# Patient Record
Sex: FEMALE | Race: BLACK OR AFRICAN AMERICAN | Employment: FULL TIME | URBAN - METROPOLITAN AREA
[De-identification: names, ages, dates, MRNs, and addresses within clinical notes are randomized per-mention and may not be internally consistent; named-entity substitution may affect disease eponyms.]

---

## 2022-01-03 ENCOUNTER — HOSPITAL ENCOUNTER (OUTPATIENT)
Dept: PHYSICAL THERAPY | Age: 47
Discharge: HOME | End: 2022-01-03
Payer: COMMERCIAL

## 2022-01-03 PROCEDURE — 97162 PT EVAL MOD COMPLEX 30 MIN: CPT

## 2022-01-03 PROCEDURE — 97110 THERAPEUTIC EXERCISES: CPT

## 2022-01-03 NOTE — PROGRESS NOTES
[facility] AT [address] [facility], [address] [place] [address], [address] [place] [address] Ln - Phone: [phone]  Fax: [phone] / [facility]  Patient Name: Hilda Garcia : 1975   Medical   Diagnosis: Pain in right knee [M25.561] Treatment Diagnosis: Right Knee Strain/Sprain; Right Knee Pain; Difficulty Walking   Onset Date: 2021     Referral Source: Mirna Kenney* Start of Care [facility]): 1/3/2022   Prior Hospitalization: See medical history Provider #: [id number]   Prior Level of Function: Unrestricted and independent with community mobility, ADLs, work, household chores, sleep, and recreation/fitness. Comorbidities: Arthritis; Bilateral Plantarfasciitis   Medications: Verified on Patient Summary List     The Plan of Care and following information is based on the information from the initial evaluation.   ===========================================================================================  Assessment / key information:  Hilda Garcia is a 42 y.o. female with Dx of Pain in right knee [M25.561]. She currently rates her pain as 10/10 at worst, 5/10 at best, primarily located at right anteromedial knee. She complains of difficulty and increase pain with walking and sleeping. Today in PT, patient relates that she had begun a fitness program and was walking 3x/week for about 5 miles, either at the beach ([facility]) or at [facility]. On 21 [facility] [place] [facility] and her R knee felt a little stiff when she first started. After about 2 miles of walking, she experienced and sudden \"pop\" at her anteromedial R knee along with intense pain. After the incident she couldn't tolerate to put weight on her R LE, but she was slowly able to walk, dragging her R LE, back to her car. Patient drove (left footed, but normally uses R LE) to a local [facility]. Patient's R knee was examined and xrays taken; she was told that there were bone fragments in her knee/patella and a \"peak\" in the bone of her R knee; she was put in a knee immobilizer brace and given axillary crutches and advised to see an Orthopedist.  Pt saw an Orthopedist about a week later and was told that she did not have any fractures or dislocations and that her injury was likely a ligament strain; she was advised to discontinue the brace and crutches and given a Rx for PT and to return if her knee was not better in a few weeks for possible MRI. Pt has not followed up with her referring Orthopedist yet, but now comes in to PT. Pt reports no prior problems with or injuries to her R knee; she reports L knee is OK and bilateral hips are OK; she does have ongoing R > L plantarfasciitis that she has been self managing after seeing a Podiatrist.  Pt c/o being unable to walk on her R LE without limping due to R knee pain. Pt co decreased R knee flexion ROM and tenderness at her right anteromedial knee joint line area. Pt also reports R knee crepitus is present, but unsure if present prior to onset of her recent R knee pain/problems. Pt c/o her R knee is bothersome at night with trying to sleep and she has been putting a pillow under it for comfort. Pt reports that her R knee was \"hot\" at first (warm to touch?) and she is uncertain if there was any swelling. Pt has tried applying ice and heat to her R knee, using topical analgesic patches on her R knee, and taking Aleve, which helps a little. 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 Pt has 3 steps to enter her home; she could go up and down them reciprocally prior to injury, but now does them one at a time. Pt c/o now being unable to squat deeply. Pt is now able to use her R LE for driving (automatic transmission). Pt likes to occasionally go bowling.   Pt has a membership to a [employer] nearby. Objective Findings:      Functional Mobility:  Sit <==> Stand with R foot somewhat forward vs. L, some use of UEs to push up on chair armrests, and reports of some increased R knee discomfort; SLS = able to perform on R LE, but increased knee discomfort, good balance left and less steady right; Heel Raises (ankle PF in SLS) = 4/5 R with knee discomfort and 5-/5 L and OK; Squat/Bend = patient able to bend over at hips/spine with some weightshift to left in order to reach hands to floor to retrieve items, but squat (flex hips and knees ~equally) to about 50% depth and weightshift to L and c/o increased R knee discomfort. Gait:  Mild limping/antalgic on R LE. Right Knee AROM (seated):  approx full motion extension; WFL flexion, but R < L with some knee discomfort R and okay L. Right LE Manual Muscle Testing (isometric hold in mid-range): Hip Flex (reclined sitting) = 5- to 5/5 and OK bilat; Hip ER (seated) = 4/5 and medial knee pain; Hip IR (seated) = 4+ to 5-/5 and some lateral/posterolatera knee discomfort; Hip ABd, Add, and Ext = Not Tested/To Be Assessed; Knee Flex (seated) = 5-/5 and posterolateral knee discomfort, Left = 5/5 and OK; Knee Ext (seated) = 4- to 4/5 with medial knee pain at 90 deg knee flex and 4+/5 R with mild medial knee pain at near full knee extension. Right LE PROM and Flexibility:  Knee Ext (distal LE supported/knee unsupported, supine) = about -5 degrees and discomfort with overpressure, L = full motion and OK; Knee Flex (@90 deg hip flex, supine) = 115 deg and discomfort near end range, L = 120 deg and OK; Hamstrings (knee ext PROM @90 deg hip flex, supine) = about -15 deg, Left = about -10 deg; Gastroc (supine) = tight right and mildly tight left; Not Tested = Hip Flex, ABd, ER, IR, and Ext, iliopsoas, Piriformis, ITB, Hip Adductors, Quadriceps, and Soleus. .  Observation and Palpation:  Moderate bilateral foot arches in sitting with feet supported and low bilateral foot arches in standing; tenderness at right anteromedial knee joint line and over MCL area, mild swelling at R anteromedial joint line area. [employer] Special Test:  Not Tested/To Be Assessed. Pt instructed in HEP and will f/u in clinic for PT.  ======================================================= ===================================  Eval Complexity:  History:  MEDIUM  Complexity : 1-2 comorbidities / personal factors will impact the outcome/ POC ;  Examination:  MEDIUM Complexity : 3 Standardized tests and measures addressing body structure, function, activity limitation and / or participation in recreation ; Presentation:  MEDIUM Complexity : Evolving with changing characteristics ; Decision Making:  MEDIUM Complexity : FOTO score of 26-74; Overall Complexity:  MEDIUM. Problem List:  pain affecting function, decrease ROM, decrease strength, edema affecting function, impaired gait/ balance, decrease ADL/ functional abilitiies, decrease activity tolerance and decrease transfer abilities. Treatment Plan may include any combination of the following:  Therapeutic exercise, Therapeutic activities, Neuromuscular re-education, Physical agent/modality, Gait/balance training, Manual therapy, Patient education, Self Care training, Functional mobility training and Stair training. Patient / Family readiness to learn indicated by:  asking questions, trying to perform skills and interest.  Persons(s) to be included in education:  Patient (P). Barriers to Learning/Limitations:  None. Measures taken, if barriers to learning: NA   Patient Goal (s): \"Get the movement back and be able to walk without a limp. \"     Rehabilitation Potential:  Good.  Short Term Goals: To be accomplished in  3-4 weeks:    1. Independent with HEP for ROM, strength to improve function for ADLs. 2.  Decrease max pain 25-50% to assist with performance of  ADLs, work, sleep, chores, and recreation/fitness.   3. Improve PROM by /= 5 degrees for R knee flex and ext to improve ADLs, work, sleep, chores, and recreation/fitness. 4.  Increase R LE MMT scores by >/= 1/3 grade and/or to >/= 5-/5 throughout.  Long Term Goals: To be accomplished in  4-6 weeks:    1. Decrease max pain 50-75% to assist with ADLs, work, sleep, chores, and recreation/fitness. 2.  Increase FOTO score to >/= 55/100 to show improved function with ADLs, work, sleep, chores, and recreation/fitness. .  3.  Will rate a +5 on Global Rating of Change and be prepared to DC to HEP. 4.  No/negligible tenderness to palpation. 5.  WFL squat depth without weightshift and no/negligible R knee discomfort. Frequency / Duration:  Patient to be seen  2  times per week for 4-6 weeks. Patient / Caregiver education and instruction:  self care and exercises, modification of activity. G-Codes (GP):  NA. Therapist Signature: Veronika Peña PT Date: 9/1/3345   Certification Period: NA Time: 1:09 PM   ===========================================================================================  I certify that the above Physical Therapy Services are being furnished while the patient is under my care. I agree with the treatment plan and certify that this therapy is necessary. Physician Signature:        Date:       Time:       Stewart Hooker*  Please sign and return to [facility] or you may fax the signed copy to [phone]. Thank you.

## 2022-01-03 NOTE — PROGRESS NOTES
PHYSICAL THERAPY - DAILY TREATMENT NOTE     Patient Name: Noé Busby        Date: 1/3/2022  : 1975   YES Patient  Verified  Visit #:     Insurance: Payor: Robbie Linder / Plan: Olivia Lowe PPO / Product Type: PPO /      In time: 11:15 AM Out time: 12:20 PM   Total Treatment Time: 65 min. Medicare/[employer] Time Tracking (below)   Total Timed Codes (min):  NA 1:1 Treatment Time:  NA     TREATMENT AREA =  Pain in right knee [M25.561]    SUBJECTIVE    Pain Level (on 0 to 10 scale):  5 / 10   Medication Changes/New allergies or changes in medical history, any new surgeries or procedures? NO    If yes, update Summary List   Subjective Functional Status/Changes:  []  No changes reported     See POC         OBJECTIVE    15 min Therapeutic Exercise:  [x]  See flow sheet   Rationale:      increase ROM and increase strength to improve the patients ability to stand, walk, ADLs, work, chores, sleep, and recreation/fitness tasks with less/no pain. Billed With/As:   [x] TE   [] TA   [] Neuro   [x] Self Care Patient Education: [x] Review HEP    [x] Progressed/Changed HEP based on:   [x] positioning   [x] body mechanics   [] transfers   [] heat/ice application    [x] other:  Patient instructed in and briefly performed beginning HEP. Patient given handouts with pictures and written directions for HEP; copies of HEP placed in patient's chart and emailed to her. Other Objective/Functional Measures:    See POC     Post Treatment Pain Level (on 0 to 10) scale:   ? / 10--Not Reassessed.      ASSESSMENT    Assessment/Changes in Function:     See POC     []  See Progress Note/Recertification   Patient will continue to benefit from skilled PT services to modify and progress therapeutic interventions, address functional mobility deficits, address ROM deficits, address strength deficits, analyze and address soft tissue restrictions, analyze and cue movement patterns, analyze and modify body mechanics/ergonomics and instruct in home and community integration to attain remaining goals. Progress toward goals / Updated goals:    See POC       PLAN    [x]  Upgrade activities as tolerated YES Continue plan of care   []  Discharge due to :    []  Other:      Therapist: Boston Landeros, PT    Date: 1/3/2022 Time: 1:10 PM   No future appointments.

## 2022-01-04 ENCOUNTER — TELEPHONE (OUTPATIENT)
Dept: PHYSICAL THERAPY | Age: 47
End: 2022-01-04

## 2022-01-10 ENCOUNTER — HOSPITAL ENCOUNTER (OUTPATIENT)
Dept: PHYSICAL THERAPY | Age: 47
End: 2022-01-10
Payer: COMMERCIAL

## 2022-01-12 ENCOUNTER — HOSPITAL ENCOUNTER (OUTPATIENT)
Dept: PHYSICAL THERAPY | Age: 47
End: 2022-01-12
Payer: COMMERCIAL

## 2022-01-17 ENCOUNTER — APPOINTMENT (OUTPATIENT)
Dept: PHYSICAL THERAPY | Age: 47
End: 2022-01-17
Payer: COMMERCIAL

## 2022-01-19 ENCOUNTER — APPOINTMENT (OUTPATIENT)
Dept: PHYSICAL THERAPY | Age: 47
End: 2022-01-19
Payer: COMMERCIAL

## 2022-01-24 ENCOUNTER — APPOINTMENT (OUTPATIENT)
Dept: PHYSICAL THERAPY | Age: 47
End: 2022-01-24
Payer: COMMERCIAL

## 2022-01-26 ENCOUNTER — APPOINTMENT (OUTPATIENT)
Dept: PHYSICAL THERAPY | Age: 47
End: 2022-01-26
Payer: COMMERCIAL

## 2022-01-31 ENCOUNTER — APPOINTMENT (OUTPATIENT)
Dept: PHYSICAL THERAPY | Age: 47
End: 2022-01-31
Payer: COMMERCIAL

## 2022-02-02 ENCOUNTER — APPOINTMENT (OUTPATIENT)
Dept: PHYSICAL THERAPY | Age: 47
End: 2022-02-02

## 2022-02-07 ENCOUNTER — APPOINTMENT (OUTPATIENT)
Dept: PHYSICAL THERAPY | Age: 47
End: 2022-02-07

## 2022-02-09 ENCOUNTER — APPOINTMENT (OUTPATIENT)
Dept: PHYSICAL THERAPY | Age: 47
End: 2022-02-09

## 2022-02-14 ENCOUNTER — APPOINTMENT (OUTPATIENT)
Dept: PHYSICAL THERAPY | Age: 47
End: 2022-02-14

## 2022-02-16 ENCOUNTER — APPOINTMENT (OUTPATIENT)
Dept: PHYSICAL THERAPY | Age: 47
End: 2022-02-16
